# Patient Record
Sex: FEMALE | Race: WHITE | HISPANIC OR LATINO | URBAN - METROPOLITAN AREA
[De-identification: names, ages, dates, MRNs, and addresses within clinical notes are randomized per-mention and may not be internally consistent; named-entity substitution may affect disease eponyms.]

---

## 2019-07-29 ENCOUNTER — EMERGENCY (EMERGENCY)
Facility: HOSPITAL | Age: 59
LOS: 1 days | Discharge: HOME | End: 2019-07-29
Admitting: EMERGENCY MEDICINE
Payer: COMMERCIAL

## 2019-07-29 VITALS
SYSTOLIC BLOOD PRESSURE: 132 MMHG | HEART RATE: 89 BPM | RESPIRATION RATE: 18 BRPM | TEMPERATURE: 99 F | OXYGEN SATURATION: 96 % | DIASTOLIC BLOOD PRESSURE: 90 MMHG

## 2019-07-29 PROCEDURE — 99283 EMERGENCY DEPT VISIT LOW MDM: CPT

## 2019-07-29 RX ORDER — CEPHALEXIN 500 MG
1 CAPSULE ORAL
Qty: 14 | Refills: 0
Start: 2019-07-29 | End: 2019-08-04

## 2019-07-29 NOTE — ED PROVIDER NOTE - PHYSICAL EXAMINATION
CONSTITUTIONAL: Well-developed; well-nourished; in no acute distress.  	SKIN: Skin is warm and dry, no acute rash.  	HEAD: Normocephalic; atraumatic.  	EYES: PERRL, EOM intact; conjunctiva and sclera clear. +mild swelling to right inner upper eyelid appears allergic, no tenderness. good VA. no eye discharge.  	ENT: airway patent  	NECK: Supple; non tender.  	NEURO: Alert, oriented. Grossly unremarkable.  PSYCH: Cooperative, appropriate.

## 2019-07-29 NOTE — ED PROVIDER NOTE - CARE PROVIDER_API CALL
Denilson Hughes)  Ophthalmology  [address] [address]  Phone: [phone]  Fax: [phone]  Follow Up Time:

## 2019-07-29 NOTE — ED PROVIDER NOTE - NSFOLLOWUPINSTRUCTIONS_ED_ALL_ED_FT
Take antihistamines  Take keflex as prescribed only if worsening swelling, redness.  Follow up with Eye doctor within 1 week    RETURN TO THE EMERGENCY DEPARTMENT FOR WORSENING SYMPTOMS INCLUDING FEVER, REDNESS, VISUAL CHANGES, OR ANY CONCERNING OR WORSENING SYMPTOMS.

## 2019-07-29 NOTE — ED PROVIDER NOTE - OBJECTIVE STATEMENT
60 yo female c/o right inner upper eyelid swelling that she woke up with this morning, described as itching. no fever or chills. no redness. no visual changes. states she has seasonal allergies.

## 2019-07-29 NOTE — ED PROVIDER NOTE - CLINICAL SUMMARY MEDICAL DECISION MAKING FREE TEXT BOX
right upper eyelid blepharitis, appears allergic, will rx keflex for possible bacterial although low suspicion, advised antihistamines, f/u eye

## 2019-07-29 NOTE — ED ADULT NURSE NOTE - NSIMPLEMENTINTERV_GEN_ALL_ED
Implemented All Universal Safety Interventions:  [place] to call system. Call bell, personal items and telephone within reach. Instruct patient to call for assistance. Room bathroom lighting operational. Non-slip footwear when patient is off stretcher. Physically safe environment: no spills, clutter or unnecessary equipment. Stretcher in lowest position, wheels locked, appropriate side rails in place.

## 2019-08-03 DIAGNOSIS — H01.001 UNSPECIFIED BLEPHARITIS RIGHT UPPER EYELID: ICD-10-CM

## 2019-08-03 DIAGNOSIS — Z79.2 LONG TERM (CURRENT) USE OF ANTIBIOTICS: ICD-10-CM

## 2019-08-03 DIAGNOSIS — Z88.8 ALLERGY STATUS TO OTHER DRUGS, MEDICAMENTS AND BIOLOGICAL SUBSTANCES: ICD-10-CM

## 2019-08-03 DIAGNOSIS — H02.841 EDEMA OF RIGHT UPPER EYELID: ICD-10-CM

## 2022-05-10 PROBLEM — Z00.00 ENCOUNTER FOR PREVENTIVE HEALTH EXAMINATION: Status: ACTIVE | Noted: 2022-05-10

## 2022-05-11 ENCOUNTER — APPOINTMENT (OUTPATIENT)
Dept: NEUROLOGY | Facility: CLINIC | Age: 62
End: 2022-05-11
Payer: COMMERCIAL

## 2022-05-11 VITALS
DIASTOLIC BLOOD PRESSURE: 80 MMHG | HEART RATE: 110 BPM | BODY MASS INDEX: 25.16 KG/M2 | TEMPERATURE: 97.7 F | OXYGEN SATURATION: 96 % | SYSTOLIC BLOOD PRESSURE: 119 MMHG | WEIGHT: 151 LBS | HEIGHT: 65 IN

## 2022-05-11 DIAGNOSIS — Z81.8 FAMILY HISTORY OF OTHER MENTAL AND BEHAVIORAL DISORDERS: ICD-10-CM

## 2022-05-11 DIAGNOSIS — M54.2 CERVICALGIA: ICD-10-CM

## 2022-05-11 DIAGNOSIS — Z82.61 FAMILY HISTORY OF ARTHRITIS: ICD-10-CM

## 2022-05-11 DIAGNOSIS — Z87.891 PERSONAL HISTORY OF NICOTINE DEPENDENCE: ICD-10-CM

## 2022-05-11 DIAGNOSIS — Z83.3 FAMILY HISTORY OF DIABETES MELLITUS: ICD-10-CM

## 2022-05-11 DIAGNOSIS — M79.2 NEURALGIA AND NEURITIS, UNSPECIFIED: ICD-10-CM

## 2022-05-11 DIAGNOSIS — Z78.9 OTHER SPECIFIED HEALTH STATUS: ICD-10-CM

## 2022-05-11 PROCEDURE — 99202 OFFICE O/P NEW SF 15 MIN: CPT

## 2022-05-11 NOTE — HISTORY OF PRESENT ILLNESS
[FreeTextEntry1] : Pain in back of neck extending up to back of head x 2 weeks.  Has been taking Advil and doing acupuncture which helps a little.  Had similar symptoms a few years ago, was diagnosed with occipital neuralgia, treated with injections which helped.  No headaches, vision change, dizziness, difficulty speaking or swallowing, numbness, weakness, tingling, pain radiating down arms or legs.

## 2022-05-11 NOTE — ASSESSMENT
[FreeTextEntry1] : Occipital neuralgia.\par \par She is traveling to [place] for a week.  \par Continue NSAIDs, ice pack, heating pad for now.\par If symptoms persist by the time she returns, will refer for nerve block.

## 2023-04-10 ENCOUNTER — EMERGENCY (EMERGENCY)
Facility: HOSPITAL | Age: 63
LOS: 1 days | Discharge: HOME | End: 2023-04-10
Attending: EMERGENCY MEDICINE | Admitting: EMERGENCY MEDICINE
Payer: OTHER MISCELLANEOUS

## 2023-04-10 VITALS
DIASTOLIC BLOOD PRESSURE: 74 MMHG | HEIGHT: 64 IN | HEART RATE: 105 BPM | SYSTOLIC BLOOD PRESSURE: 108 MMHG | TEMPERATURE: 99 F | OXYGEN SATURATION: 97 % | RESPIRATION RATE: 19 BRPM | WEIGHT: 147.93 LBS

## 2023-04-10 VITALS
HEART RATE: 94 BPM | TEMPERATURE: 99 F | SYSTOLIC BLOOD PRESSURE: 112 MMHG | RESPIRATION RATE: 17 BRPM | DIASTOLIC BLOOD PRESSURE: 77 MMHG | OXYGEN SATURATION: 96 %

## 2023-04-10 PROCEDURE — 73562 X-RAY EXAM OF KNEE 3: CPT | Mod: 26,RT

## 2023-04-10 PROCEDURE — 99284 EMERGENCY DEPT VISIT MOD MDM: CPT

## 2023-04-10 NOTE — ED PROVIDER NOTE - PATIENT PORTAL LINK FT
You can access the FollowMyHealth Patient Portal offered by [facility] by registering at the following website: [email or web address]. By joining [employer]’s FollowMyHealth portal, you will also be able to view your health information using other applications (apps) compatible with our system.

## 2023-04-10 NOTE — ED ADULT NURSE NOTE - OBJECTIVE STATEMENT
Pt is a/ox3 c/o right knee pain, pt states "on Friday she was at work when he right knee buckled, unable to bare weight , pain and swelling on right knee. States that she been taking ibuprofen and RICE right knee". Denies falling. Denies LOC, SOB or chest pain. No distress noted currently. +pain to right knee +minor swelling to right knee Pt is a/ox3 c/o right knee pain, pt states "on Friday she was at work when he right knee buckled, unable to bare weight , pain and swelling on right knee. States that she been taking ibuprofen and RICE right knee". Denies falling. Denies LOC, SOB or chest pain. No distress noted currently. +pain to right knee +minor swelling to right knee. No deformities noted.

## 2023-04-10 NOTE — ED PROVIDER NOTE - CARE PROVIDERS DIRECT ADDRESSES
[email or web address]

## 2023-04-10 NOTE — ED PROVIDER NOTE - CARE PROVIDER_API CALL
Jesu Arguello)  Orthopaedic Surgery  [address] [place] [address]  Phone: [phone]  Fax: [phone]  Follow Up Time:     Mart Gamboa)  Orthopaedic Surgery  [address] [place] [address]  Phone: [phone]  Fax: [phone]  Follow Up Time:     Sergio Velazquez)  Orthopaedic Surgery  [address] [place] [address], [address]  [place] [address]  Phone: [phone]  Fax: [phone]  Follow Up Time:

## 2023-04-10 NOTE — ED PROVIDER NOTE - OBJECTIVE STATEMENT
62-year-old woman presenting with pain and swelling to the right knee since Friday (4 days ago) today.  She was at work kneeling down to do something when she stood up she developed severe pain in her knee and it felt like it locked somewhat.  Since then she has been icing it on and off and applying compression with a knee brace, she has also been taking over-the-counter ibuprofen for pain and using a cane.  She does not have any history of any knee additional problems 62-year-old woman presenting with pain and swelling to the right knee since Friday (4 days ago) today.  She was at work kneeling down to do something when she stood up she developed severe pain in her knee and it felt like it locked somewhat.  Since then she has been icing it on and off and applying compression with a knee brace, she has also been taking over-the-counter ibuprofen for pain and using a cane.  She does not have any history of any knee additional problems.

## 2023-04-10 NOTE — ED ADULT NURSE NOTE - CAS DISCH BELONGINGS RETURNED
CC:  Kemi Locke is here today for follow up.    Medications: medications verified and updated  Refills needed today?  YES  denies Latex allergy or sensitivity  Patient would like communication of their results via:      Cell Phone:   Telephone Information:   Mobile [phone]     Okay to leave a message containing results? Yes   Reviewed overdue health maintenance topics with patient.             Not applicable

## 2023-04-10 NOTE — ED ADULT NURSE NOTE - CAS EDN DISCHARGE ASSESSMENT
given cane/Alert and oriented to person, place and time/Patient baseline mental status/Awake/Symptoms improved

## 2023-04-10 NOTE — ED PROVIDER NOTE - PROVIDER TOKENS
PROVIDER:[TOKEN:[[id number]]],PROVIDER:[TOKEN:[[id number]]],PROVIDER:[TOKEN:[[id number]]]

## 2023-04-10 NOTE — ED PROVIDER NOTE - PHYSICAL EXAMINATION
VITAL SIGNS: I have reviewed nursing notes and confirm.  CONST: Well-developed; well-nourished; No apparent distress.  MSK: Mild swelling to R knee medially and laterally, + pain with flexing, Patella tendon intact, LCL and MCL appear clinically intact, patient is tensing up too much to full assess ACL/PCL but they are not grossy unstable.   NEURO: Alert, oriented. Speech is fluent and appropriate. Moving all extremities appropriately.  SKIN: R knee swelling as above.   PSYCH: Cooperative. Appropriate mood, language, and behavior.

## 2023-04-10 NOTE — ED PROVIDER NOTE - NSFOLLOWUPINSTRUCTIONS_ED_ALL_ED_FT
Sprain- suspected soft tissue injury of the knee.     Please follow up with one of our Orthopedic MDs. You will need an MRI of the knee I suspect.     A sprain is a stretch or tear in one of the tough, fiber-like tissues (ligaments) in your body. This is caused by an injury to the area such as a twisting mechanism. Symptoms include pain, swelling, or bruising. Rest that area over the next several days and slowly resume activity when tolerated. Ice can help with swelling and pain.     SEEK IMMEDIATE MEDICAL CARE IF YOU HAVE ANY OF THE FOLLOWING SYMPTOMS: worsening pain, inability to move that body part, numbness or tingling.

## 2023-04-10 NOTE — ED PROVIDER NOTE - CLINICAL SUMMARY MEDICAL DECISION MAKING FREE TEXT BOX
62-year-old woman presenting with right knee pain.  It happened after standing up at work.  Her symptom constellation is most suggestive of a meniscal or other soft tissue knee injury.  She just took some ibuprofen about 2 hours prior to arrival.  We will check a knee x-ray.  Anticipate having her continue to use her knee brace as well as have her follow-up with orthopedics.  She will likely need an MRI.

## 2023-04-10 NOTE — ED ADULT TRIAGE NOTE - CHIEF COMPLAINT QUOTE
Pt walk in w cane complaining of R knee pain secondary to injury at work last Friday. Pt has been RICEing knee since then but states pain hasn't improved.
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
## 2023-04-12 DIAGNOSIS — M25.561 PAIN IN RIGHT KNEE: ICD-10-CM

## 2023-04-12 DIAGNOSIS — S89.91XA UNSPECIFIED INJURY OF RIGHT LOWER LEG, INITIAL ENCOUNTER: ICD-10-CM
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
## 2023-04-12 DIAGNOSIS — Z88.9 ALLERGY STATUS TO UNSPECIFIED DRUGS, MEDICAMENTS AND BIOLOGICAL SUBSTANCES: ICD-10-CM

## 2023-04-17 PROBLEM — Z00.00 ENCOUNTER FOR PREVENTIVE HEALTH EXAMINATION: Noted: 2023-04-17

## 2023-04-20 ENCOUNTER — APPOINTMENT (OUTPATIENT)
Dept: ORTHOPEDIC SURGERY | Facility: CLINIC | Age: 63
End: 2023-04-20
Payer: OTHER MISCELLANEOUS

## 2023-04-20 VITALS — HEIGHT: 64 IN | BODY MASS INDEX: 24.24 KG/M2 | RESPIRATION RATE: 16 BRPM | WEIGHT: 142 LBS

## 2023-04-20 PROCEDURE — 99204 OFFICE O/P NEW MOD 45 MIN: CPT

## 2023-04-20 PROCEDURE — 73564 X-RAY EXAM KNEE 4 OR MORE: CPT | Mod: RT

## 2023-04-20 RX ORDER — DICLOFENAC SODIUM 50 MG/1
50 TABLET, DELAYED RELEASE ORAL
Qty: 28 | Refills: 0 | Status: ACTIVE | COMMUNITY
Start: 2023-04-20 | End: 1900-01-01

## 2023-04-21 NOTE — DISCUSSION/SUMMARY
[de-identified] : Discussed findings of today's exam and possible causes of patient's pain.  Educated patient on their most probable diagnosis of  knee arthritis and possible meniscal tear.  Reviewed possible courses of treatment, and we collaboratively decided best course of treatment at this time will include\par 1. MRI of the knee ordered\par 2. knee brace provided\par 3. diclofenac prescription provided\par 4. continue use of cane\par \par patient declines aspiration/injection today\par \par follow up after imaging.\par \par Gloria Yoon MD, EdM\par Sports Medicine PM&R\par Department of Orthopedics

## 2023-04-21 NOTE — PHYSICAL EXAM
[de-identified] : Constitutional: Well-nourished, well-developed, No acute distress\par Respiratory:  Good respiratory effort, no SOB\par Lymphatic: No regional lymphadenopathy, no lymphedema\par Psychiatric: Pleasant and normal affect, alert and oriented x3\par Skin: Clean dry and intact B/L UE/LE\par Musculoskeletal: normal except where as noted in regional exam\par \par right    Knee:\par APPEARANCE: no marked deformities + effusion\par POSITIVE TENDERNESS:  medial and lateral joint line. \par ROM: lacks 20 degrees extension, pain with flexion\par SPECIAL TESTS: stable v/v stress. painless grind. neg Lachman's. neg ant/post drawer. pos Pawan's. \par NEURO: Normal sensation of LE, DTRs 2+/4 patella and achilles\par PULSES: 2+ DP/PT pulses\par B/L Hips: No asymmetry, malalignment, or swelling, Full ROM, 5/5 strength in flexion/ext, IR/ER, Abd/Add, Joints stable\par B/L Ankles: No asymmetry, malalignment, or swelling, Full ROM, 5/5 strength in DF/PF/Inv/Ev, Joints stable  [de-identified] : \par The following radiographs were ordered and read by me during this patient's visit. I reviewed each radiograph in detail with the patient and discussed the findings as highlighted below. \par \par 4 views of the right knee were obtained today that show no fracture, or dislocation. There are severe lateral compartment degenerative changes seen.

## 2023-04-21 NOTE — REASON FOR VISIT
[Workers' Comp: Date of Injury: _______] : This visit is related to worker's compensation. Date of Injury: [unfilled] [FreeTextEntry2] : Right Knee Pain

## 2023-04-21 NOTE — HISTORY OF PRESENT ILLNESS
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  Pain is primarily located at the posterior and medial aspect.   It began 4/8/23, when she was kneeling on the floor in to clear commercial space, she was unable to get up and needed assistance.  Pain is described as constant  in nature, 8/10 in intensity, worse with ascending stairs, better with RICE method. She has been taking OTC Tylenol and Advil as needed. She reports no bruising or swelling but did mentions episodes of locking and instability. Denies numbness, tingling, weakness of the lower extremities. "

## 2023-04-23 ENCOUNTER — FORM ENCOUNTER (OUTPATIENT)
Age: 63
End: 2023-04-23

## 2023-05-09 PROBLEM — Z78.9 OTHER SPECIFIED HEALTH STATUS: Chronic | Status: ACTIVE | Noted: 2023-04-10

## 2023-05-22 ENCOUNTER — APPOINTMENT (OUTPATIENT)
Dept: ORTHOPEDIC SURGERY | Facility: CLINIC | Age: 63
End: 2023-05-22
Payer: OTHER MISCELLANEOUS

## 2023-05-22 VITALS
BODY MASS INDEX: 25.27 KG/M2 | SYSTOLIC BLOOD PRESSURE: 126 MMHG | DIASTOLIC BLOOD PRESSURE: 78 MMHG | WEIGHT: 148 LBS | OXYGEN SATURATION: 98 % | HEIGHT: 64 IN | HEART RATE: 98 BPM

## 2023-05-22 DIAGNOSIS — S89.90XA UNSPECIFIED INJURY OF UNSPECIFIED LOWER LEG, INITIAL ENCOUNTER: ICD-10-CM

## 2023-05-22 PROCEDURE — 99214 OFFICE O/P EST MOD 30 MIN: CPT

## 2023-05-22 RX ORDER — DICLOFENAC SODIUM 50 MG/1
50 TABLET, DELAYED RELEASE ORAL
Qty: 28 | Refills: 0 | Status: ACTIVE | COMMUNITY
Start: 2023-05-22 | End: 1900-01-01

## 2023-06-01 ENCOUNTER — APPOINTMENT (OUTPATIENT)
Dept: ORTHOPEDIC SURGERY | Facility: CLINIC | Age: 63
End: 2023-06-01

## 2023-06-03 PROBLEM — S89.90XA KNEE INJURY: Status: ACTIVE | Noted: 2023-04-20

## 2023-06-03 NOTE — ADDENDUM
[FreeTextEntry1] : I Rosetta Bonilla, JESSICA, assisted in the history and documentation of the patient with Dr Gloria Yoon on 05/22/2023
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
## 2023-06-03 NOTE — PHYSICAL EXAM
[de-identified] : Constitutional: Well-nourished, well-developed, No acute distress\par Respiratory:  Good respiratory effort, no SOB\par Lymphatic: No regional lymphadenopathy, no lymphedema\par Psychiatric: Pleasant and normal affect, alert and oriented x3\par Skin: Clean dry and intact B/L UE/LE\par Musculoskeletal: normal except where as noted in regional exam\par \par Right  Knee:\par APPEARANCE: no marked deformities + effusion\par POSITIVE TENDERNESS:  medial and lateral joint line. \par ROM: lacks 20 degrees extension, pain with flexion\par SPECIAL TESTS: stable v/v stress. painless grind. neg Lachman's. neg ant/post drawer. pos Pawan's. \par NEURO: Normal sensation of LE, DTRs 2+/4 patella and achilles\par PULSES: 2+ DP/PT pulses\par B/L Hips: No asymmetry, malalignment, or swelling, Full ROM, 5/5 strength in flexion/ext, IR/ER, Abd/Add, Joints stable\par B/L Ankles: No asymmetry, malalignment, or swelling, Full ROM, 5/5 strength in DF/PF/Inv/Ev, Joints stable  [de-identified] : MRI of Right knee done at [facility] radiology on 5/12/23\par \par Impression noted in chart\par \par Complete tear and maceration of the anterior horn, body and posterior horn or the medial meniscus. Complete tear of the anterior horn, body and posterior horn of the lateral meniscus. High grade partial tear of the ACL. There is a complete tear of the PCL. Grade 1 mcl tear. Grade 2 LCL tear. There is a 1 cm chondral defect at the anterior aspect of the medial tibial plateau.\par Chondromalacia\par Medial and lateral joint compartment narrowing.

## 2023-06-03 NOTE — DISCUSSION/SUMMARY
[de-identified] : Discussed findings of today's exam and possible causes of the patient's pain. Educated the patient on their most probable diagnosis of right knee ligament tears, meniscus tear, and degeneration of the joint. Reviewed possible courses of treatment, and we collaboratively decided the best course of treatment at this time will include:\par 1. MRI of the right knee was reviewed\par 2. Patient referred to orthopedic surgery\par 3. Prescribed diclofenac \par \par Gloria Yoon MD, EdM\par Sports Medicine PM&R\par Department of Orthopedics\par

## 2023-06-28 ENCOUNTER — FORM ENCOUNTER (OUTPATIENT)
Age: 63
End: 2023-06-28